# Patient Record
Sex: FEMALE | Race: WHITE | Employment: STUDENT | URBAN - METROPOLITAN AREA
[De-identification: names, ages, dates, MRNs, and addresses within clinical notes are randomized per-mention and may not be internally consistent; named-entity substitution may affect disease eponyms.]

---
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
## 2023-02-01 RX ORDER — PANTOPRAZOLE SODIUM 40 MG/1
TABLET, DELAYED RELEASE ORAL
Qty: 180 TABLET | Refills: 3 | OUTPATIENT
Start: 2023-02-01

## 2023-05-24 ENCOUNTER — TELEPHONE (OUTPATIENT)
Dept: FAMILY MEDICINE CLINIC | Facility: CLINIC | Age: 18
End: 2023-05-24

## 2023-05-24 DIAGNOSIS — K21.9 GASTROESOPHAGEAL REFLUX DISEASE WITHOUT ESOPHAGITIS: Primary | ICD-10-CM

## 2023-05-24 RX ORDER — PANTOPRAZOLE SODIUM 40 MG/1
40 TABLET, DELAYED RELEASE ORAL DAILY
Qty: 90 TABLET | Refills: 0 | Status: SHIPPED | OUTPATIENT
Start: 2023-05-24

## 2023-06-09 DIAGNOSIS — K21.9 GASTROESOPHAGEAL REFLUX DISEASE WITHOUT ESOPHAGITIS: ICD-10-CM

## 2023-06-09 RX ORDER — PANTOPRAZOLE SODIUM 40 MG/1
TABLET, DELAYED RELEASE ORAL
Qty: 180 TABLET | Refills: 3 | Status: SHIPPED | OUTPATIENT
Start: 2023-06-09

## 2023-09-06 SDOH — ECONOMIC STABILITY: FOOD INSECURITY: WITHIN THE PAST 12 MONTHS, THE FOOD YOU BOUGHT JUST DIDN'T LAST AND YOU DIDN'T HAVE MONEY TO GET MORE.: PATIENT DECLINED

## 2023-09-06 SDOH — ECONOMIC STABILITY: HOUSING INSECURITY
IN THE LAST 12 MONTHS, WAS THERE A TIME WHEN YOU DID NOT HAVE A STEADY PLACE TO SLEEP OR SLEPT IN A SHELTER (INCLUDING NOW)?: NO

## 2023-09-06 SDOH — ECONOMIC STABILITY: FOOD INSECURITY: WITHIN THE PAST 12 MONTHS, YOU WORRIED THAT YOUR FOOD WOULD RUN OUT BEFORE YOU GOT MONEY TO BUY MORE.: PATIENT DECLINED

## 2023-09-06 SDOH — ECONOMIC STABILITY: TRANSPORTATION INSECURITY
IN THE PAST 12 MONTHS, HAS LACK OF TRANSPORTATION KEPT YOU FROM MEETINGS, WORK, OR FROM GETTING THINGS NEEDED FOR DAILY LIVING?: NO

## 2023-09-06 SDOH — ECONOMIC STABILITY: INCOME INSECURITY: HOW HARD IS IT FOR YOU TO PAY FOR THE VERY BASICS LIKE FOOD, HOUSING, MEDICAL CARE, AND HEATING?: PATIENT DECLINED

## 2023-09-07 ENCOUNTER — OFFICE VISIT (OUTPATIENT)
Dept: FAMILY MEDICINE CLINIC | Facility: CLINIC | Age: 18
End: 2023-09-07
Payer: COMMERCIAL

## 2023-09-07 ENCOUNTER — TELEPHONE (OUTPATIENT)
Dept: FAMILY MEDICINE CLINIC | Facility: CLINIC | Age: 18
End: 2023-09-07

## 2023-09-07 VITALS
DIASTOLIC BLOOD PRESSURE: 70 MMHG | BODY MASS INDEX: 31.15 KG/M2 | RESPIRATION RATE: 16 BRPM | OXYGEN SATURATION: 98 % | HEIGHT: 61 IN | WEIGHT: 165 LBS | TEMPERATURE: 97.1 F | SYSTOLIC BLOOD PRESSURE: 108 MMHG | HEART RATE: 80 BPM

## 2023-09-07 DIAGNOSIS — K21.9 GASTROESOPHAGEAL REFLUX DISEASE WITHOUT ESOPHAGITIS: ICD-10-CM

## 2023-09-07 DIAGNOSIS — L40.9 PSORIASIS (A TYPE OF SKIN INFLAMMATION): Primary | ICD-10-CM

## 2023-09-07 DIAGNOSIS — Z23 ENCOUNTER FOR ADMINISTRATION OF VACCINE: ICD-10-CM

## 2023-09-07 DIAGNOSIS — D50.8 IRON DEFICIENCY ANEMIA SECONDARY TO INADEQUATE DIETARY IRON INTAKE: Primary | ICD-10-CM

## 2023-09-07 LAB
BASOPHILS # BLD: 0.1 K/UL (ref 0–0.2)
BASOPHILS NFR BLD: 1 % (ref 0–2)
DIFFERENTIAL METHOD BLD: ABNORMAL
EOSINOPHIL # BLD: 0.3 K/UL (ref 0–0.8)
EOSINOPHIL NFR BLD: 3 % (ref 0.5–7.8)
ERYTHROCYTE [DISTWIDTH] IN BLOOD BY AUTOMATED COUNT: 17.1 % (ref 11.9–14.6)
HCT VFR BLD AUTO: 37.1 % (ref 35.8–46.3)
HGB BLD-MCNC: 11.6 G/DL (ref 11.7–15.4)
IMM GRANULOCYTES # BLD AUTO: 0 K/UL (ref 0–0.5)
IMM GRANULOCYTES NFR BLD AUTO: 0 % (ref 0–5)
LYMPHOCYTES # BLD: 3.3 K/UL (ref 0.5–4.6)
LYMPHOCYTES NFR BLD: 36 % (ref 13–44)
MCH RBC QN AUTO: 24.9 PG (ref 26.1–32.9)
MCHC RBC AUTO-ENTMCNC: 31.3 G/DL (ref 31.4–35)
MCV RBC AUTO: 79.6 FL (ref 82–102)
MONOCYTES # BLD: 0.9 K/UL (ref 0.1–1.3)
MONOCYTES NFR BLD: 10 % (ref 4–12)
NEUTS SEG # BLD: 4.5 K/UL (ref 1.7–8.2)
NEUTS SEG NFR BLD: 50 % (ref 43–78)
NRBC # BLD: 0 K/UL (ref 0–0.2)
PLATELET # BLD AUTO: 375 K/UL (ref 150–450)
PMV BLD AUTO: 9.6 FL (ref 9.4–12.3)
RBC # BLD AUTO: 4.66 M/UL (ref 4.05–5.2)
WBC # BLD AUTO: 9 K/UL (ref 4.3–11.1)

## 2023-09-07 PROCEDURE — 90674 CCIIV4 VAC NO PRSV 0.5 ML IM: CPT | Performed by: NURSE PRACTITIONER

## 2023-09-07 PROCEDURE — 90471 IMMUNIZATION ADMIN: CPT | Performed by: NURSE PRACTITIONER

## 2023-09-07 PROCEDURE — 99214 OFFICE O/P EST MOD 30 MIN: CPT | Performed by: NURSE PRACTITIONER

## 2023-09-07 RX ORDER — PANTOPRAZOLE SODIUM 40 MG/1
TABLET, DELAYED RELEASE ORAL
Qty: 180 TABLET | Refills: 3 | Status: SHIPPED | OUTPATIENT
Start: 2023-09-07

## 2023-09-07 ASSESSMENT — ENCOUNTER SYMPTOMS
VOMITING: 0
CHEST TIGHTNESS: 0
WHEEZING: 0
ABDOMINAL PAIN: 0
CONSTIPATION: 0
SHORTNESS OF BREATH: 0
EYE PAIN: 0
COUGH: 0
RHINORRHEA: 0
DIARRHEA: 0
EYE DISCHARGE: 0
BLOOD IN STOOL: 0

## 2023-09-07 ASSESSMENT — PATIENT HEALTH QUESTIONNAIRE - PHQ9
2. FEELING DOWN, DEPRESSED OR HOPELESS: 0
SUM OF ALL RESPONSES TO PHQ QUESTIONS 1-9: 0
SUM OF ALL RESPONSES TO PHQ QUESTIONS 1-9: 0
SUM OF ALL RESPONSES TO PHQ9 QUESTIONS 1 & 2: 0
SUM OF ALL RESPONSES TO PHQ QUESTIONS 1-9: 0
1. LITTLE INTEREST OR PLEASURE IN DOING THINGS: 0
SUM OF ALL RESPONSES TO PHQ QUESTIONS 1-9: 0

## 2023-09-07 NOTE — PROGRESS NOTES
Jessica Parson (:  2005) is a 19 y.o. female,Established patient, here for evaluation of the following chief complaint(s):  Joint Pain (Follow up ) and Gastroesophageal Reflux (Follow up )      Results for orders placed or performed in visit on 23   Iron   Result Value Ref Range    Iron 70 35 - 150 ug/dL   Ferritin   Result Value Ref Range    Ferritin 4 (L) 8 - 388 NG/ML   CBC with Auto Differential   Result Value Ref Range    WBC 9.0 4.3 - 11.1 K/uL    RBC 4.66 4.05 - 5.2 M/uL    Hemoglobin 11.6 (L) 11.7 - 15.4 g/dL    Hematocrit 37.1 35.8 - 46.3 %    MCV 79.6 (L) 82 - 102 FL    MCH 24.9 (L) 26.1 - 32.9 PG    MCHC 31.3 (L) 31.4 - 35.0 g/dL    RDW 17.1 (H) 11.9 - 14.6 %    Platelets 024 833 - 149 K/uL    MPV 9.6 9.4 - 12.3 FL    nRBC 0.00 0.0 - 0.2 K/uL    Differential Type AUTOMATED      Neutrophils % 50 43 - 78 %    Lymphocytes % 36 13 - 44 %    Monocytes % 10 4.0 - 12.0 %    Eosinophils % 3 0.5 - 7.8 %    Basophils % 1 0.0 - 2.0 %    Immature Granulocytes 0 0.0 - 5.0 %    Neutrophils Absolute 4.5 1.7 - 8.2 K/UL    Lymphocytes Absolute 3.3 0.5 - 4.6 K/UL    Monocytes Absolute 0.9 0.1 - 1.3 K/UL    Eosinophils Absolute 0.3 0.0 - 0.8 K/UL    Basophils Absolute 0.1 0.0 - 0.2 K/UL    Absolute Immature Granulocyte 0.0 0.0 - 0.5 K/UL     Iron levels have improved. See lab result note. ASSESSMENT/PLAN:  1. Iron deficiency anemia secondary to inadequate dietary iron intake  -     CBC with Auto Differential; Future  -     Ferritin; Future  -     Iron; Future  Recheck labs. Continue MV with iron. 2. Gastroesophageal reflux disease without esophagitis  -     pantoprazole (PROTONIX) 40 MG tablet; TAKE 1 TABLET BY MOUTH TWICE DAILY, Disp-180 tablet, R-3Normal  Continue PPI. Follow up in 1 year. 3. 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         Return in about 6 months (around

## 2023-09-07 NOTE — TELEPHONE ENCOUNTER
Pt. Would like new referral in for Rheumatology, looks like previous one Hope noted on stating pt.  Unable to be scheduled with the notes on referral

## 2023-09-07 NOTE — TELEPHONE ENCOUNTER
Rewrote her referral to rheumatologist.  Patient was denied as her DAVE and RA was negative.   Iraj De Oliveira

## 2023-09-08 LAB
FERRITIN SERPL-MCNC: 4 NG/ML (ref 8–388)
IRON SERPL-MCNC: 70 UG/DL (ref 35–150)

## 2023-09-09 ASSESSMENT — ENCOUNTER SYMPTOMS: HEARTBURN: 1

## 2023-11-30 NOTE — PROGRESS NOTES
mcg/actuation aerosol inhaler      Ruxolitinib Phosphate (OPZELURA) 1.5 % CREA Opzelura 1.5 % topical cream       No current facility-administered medications for this visit. Physical Exam:  Blood pressure (!) 128/55, pulse 68, height 1.549 m (5' 0.98\"), weight 75.8 kg (167 lb). General:  Patient alert, cooperative and in no apparent distress. HEENT: Pupils equally reactive to light and accomodation, no scleral injections. Neck supple, no lymphadenopathy, no thyromegaly. No alopecia. Skin:  erythema over cheeks, no lesions in mouth, plaques over right antecubital fossa and right extensor wrist. No nail abnormalities. Cardiac:  Normal rate and rhythm. No murmurs, rubs and gallops appreciated. Lungs: Clear to auscultation bilaterally. Abdomen: Soft, non tender with no hepatosplenomegaly. Neurologic:  Oriented, normal speech and affect. Normal gait. Extremities:  No edema in bilateral lower extremities, no cyanosis or clubbing. Muskoskeletal Exam:     I examined the neck, spine, shoulders, elbows, wrists, MCPs, PIPs, DIPs, knees, hips, ankles and feet bilaterally for strength, range of motion, deformity, tenderness, swelling, and synovitis. The findings are:  No joint tenderness or synovitis of the 2nd to 5th MCP, PIP or DIP joints. No synovitis or tenderness of the wrists and no warmth or redness. Intact ROM of the wrist to flexion and extension. No tenderness of the elbows with no swelling, warmth or redness with intact ROM to flexion and extension. No tenderness of the shoulders anteriorly with intact ROM to abduction and internal rotation. No tenderness of the C spine and no tenderness of the para spinal muscles of the neck. No tenderness of the T and L spine. No SI joint tenderness. No mid joint line tenderness of the knees with no swelling, warmth or redness. Right patella is medially tracking. No tenderness with synovitis in the ankles with no warmth or redness.  No

## 2023-12-01 ENCOUNTER — OFFICE VISIT (OUTPATIENT)
Dept: RHEUMATOLOGY | Age: 18
End: 2023-12-01
Payer: COMMERCIAL

## 2023-12-01 ENCOUNTER — HOSPITAL ENCOUNTER (OUTPATIENT)
Dept: GENERAL RADIOLOGY | Age: 18
Discharge: HOME | End: 2023-12-01
Payer: COMMERCIAL

## 2023-12-01 VITALS
WEIGHT: 167 LBS | BODY MASS INDEX: 31.53 KG/M2 | DIASTOLIC BLOOD PRESSURE: 55 MMHG | HEIGHT: 61 IN | HEART RATE: 68 BPM | SYSTOLIC BLOOD PRESSURE: 128 MMHG

## 2023-12-01 DIAGNOSIS — M25.50 ARTHRALGIA, UNSPECIFIED JOINT: ICD-10-CM

## 2023-12-01 DIAGNOSIS — M35.7 BENIGN JOINT HYPERMOBILITY: ICD-10-CM

## 2023-12-01 DIAGNOSIS — D50.9 IRON DEFICIENCY ANEMIA, UNSPECIFIED IRON DEFICIENCY ANEMIA TYPE: ICD-10-CM

## 2023-12-01 DIAGNOSIS — M25.562 CHRONIC PAIN OF BOTH KNEES: ICD-10-CM

## 2023-12-01 DIAGNOSIS — M35.7 BENIGN JOINT HYPERMOBILITY: Primary | ICD-10-CM

## 2023-12-01 DIAGNOSIS — G89.29 CHRONIC PAIN OF BOTH KNEES: ICD-10-CM

## 2023-12-01 DIAGNOSIS — M25.561 CHRONIC PAIN OF BOTH KNEES: ICD-10-CM

## 2023-12-01 DIAGNOSIS — L30.9 ECZEMA, UNSPECIFIED TYPE: ICD-10-CM

## 2023-12-01 LAB
ALBUMIN SERPL-MCNC: 4 G/DL (ref 3.5–5)
ALBUMIN/GLOB SERPL: 1.3 (ref 0.4–1.6)
ALP SERPL-CCNC: 38 U/L (ref 50–136)
ALT SERPL-CCNC: 19 U/L (ref 12–65)
ANION GAP SERPL CALC-SCNC: 7 MMOL/L (ref 2–11)
APPEARANCE UR: CLEAR
AST SERPL-CCNC: 16 U/L (ref 15–37)
BACTERIA URNS QL MICRO: ABNORMAL /HPF
BASOPHILS # BLD: 0.1 K/UL (ref 0–0.2)
BASOPHILS NFR BLD: 1 % (ref 0–2)
BILIRUB SERPL-MCNC: 1.2 MG/DL (ref 0.2–1.1)
BILIRUB UR QL: NEGATIVE
BUN SERPL-MCNC: 11 MG/DL (ref 6–23)
CALCIUM SERPL-MCNC: 9.1 MG/DL (ref 8.3–10.4)
CHLORIDE SERPL-SCNC: 108 MMOL/L (ref 101–110)
CO2 SERPL-SCNC: 24 MMOL/L (ref 21–32)
COLOR UR: ABNORMAL
CREAT SERPL-MCNC: 0.9 MG/DL (ref 0.6–1)
CREAT UR-MCNC: 184 MG/DL
CRP SERPL-MCNC: 0.5 MG/DL (ref 0–0.9)
DIFFERENTIAL METHOD BLD: NORMAL
EOSINOPHIL # BLD: 0.2 K/UL (ref 0–0.8)
EOSINOPHIL NFR BLD: 3 % (ref 0.5–7.8)
EPI CELLS #/AREA URNS HPF: ABNORMAL /HPF
ERYTHROCYTE [DISTWIDTH] IN BLOOD BY AUTOMATED COUNT: 13.3 % (ref 11.9–14.6)
ERYTHROCYTE [SEDIMENTATION RATE] IN BLOOD: 2 MM/HR (ref 0–20)
GLOBULIN SER CALC-MCNC: 3 G/DL (ref 2.8–4.5)
GLUCOSE SERPL-MCNC: 87 MG/DL (ref 65–100)
GLUCOSE UR STRIP.AUTO-MCNC: NEGATIVE MG/DL
HCT VFR BLD AUTO: 36.5 % (ref 35.8–46.3)
HGB BLD-MCNC: 11.8 G/DL (ref 11.7–15.4)
HGB UR QL STRIP: ABNORMAL
IMM GRANULOCYTES # BLD AUTO: 0 K/UL (ref 0–0.5)
IMM GRANULOCYTES NFR BLD AUTO: 0 % (ref 0–5)
KETONES UR QL STRIP.AUTO: NEGATIVE MG/DL
LEUKOCYTE ESTERASE UR QL STRIP.AUTO: ABNORMAL
LYMPHOCYTES # BLD: 2.1 K/UL (ref 0.5–4.6)
LYMPHOCYTES NFR BLD: 30 % (ref 13–44)
MCH RBC QN AUTO: 27.3 PG (ref 26.1–32.9)
MCHC RBC AUTO-ENTMCNC: 32.3 G/DL (ref 31.4–35)
MCV RBC AUTO: 84.3 FL (ref 82–102)
MONOCYTES # BLD: 0.6 K/UL (ref 0.1–1.3)
MONOCYTES NFR BLD: 9 % (ref 4–12)
NEUTS SEG # BLD: 4 K/UL (ref 1.7–8.2)
NEUTS SEG NFR BLD: 57 % (ref 43–78)
NITRITE UR QL STRIP.AUTO: NEGATIVE
NRBC # BLD: 0 K/UL (ref 0–0.2)
OTHER OBSERVATIONS: ABNORMAL
PH UR STRIP: 6.5 (ref 5–9)
PLATELET # BLD AUTO: 322 K/UL (ref 150–450)
PMV BLD AUTO: 9.5 FL (ref 9.4–12.3)
POTASSIUM SERPL-SCNC: 4.2 MMOL/L (ref 3.5–5.1)
PROT SERPL-MCNC: 7 G/DL (ref 6.3–8.2)
PROT UR STRIP-MCNC: NEGATIVE MG/DL
PROT UR-MCNC: 15 MG/DL
PROT/CREAT UR-RTO: 0.1
RBC # BLD AUTO: 4.33 M/UL (ref 4.05–5.2)
RBC #/AREA URNS HPF: ABNORMAL /HPF
SODIUM SERPL-SCNC: 139 MMOL/L (ref 133–143)
SP GR UR REFRACTOMETRY: 1.02 (ref 1–1.02)
UROBILINOGEN UR QL STRIP.AUTO: 1 EU/DL (ref 0.2–1)
WBC # BLD AUTO: 7.1 K/UL (ref 4.3–11.1)
WBC URNS QL MICRO: ABNORMAL /HPF

## 2023-12-01 PROCEDURE — 73110 X-RAY EXAM OF WRIST: CPT

## 2023-12-01 PROCEDURE — 73564 X-RAY EXAM KNEE 4 OR MORE: CPT

## 2023-12-01 PROCEDURE — 99244 OFF/OP CNSLTJ NEW/EST MOD 40: CPT | Performed by: INTERNAL MEDICINE

## 2023-12-01 PROCEDURE — 73130 X-RAY EXAM OF HAND: CPT

## 2023-12-01 RX ORDER — ALBUTEROL SULFATE 90 UG/1
AEROSOL, METERED RESPIRATORY (INHALATION)
COMMUNITY

## 2023-12-01 RX ORDER — RUXOLITINIB 15 MG/G
CREAM TOPICAL
COMMUNITY

## 2023-12-01 ASSESSMENT — JOINT PAIN
TOTAL NUMBER OF SWOLLEN JOINTS: 0
TOTAL NUMBER OF TENDER JOINTS: 0

## 2023-12-01 ASSESSMENT — ROUTINE ASSESSMENT OF PATIENT INDEX DATA (RAPID3)
WHEN YOU AWAKENED IN THE MORNING OVER THE LAST WEEK, PLEASE INDICATE THE AMOUNT OF TIME IT TAKES UNTIL YOU ARE AS LIMBER AS YOU WILL BE FOR THE DAY: 30 MIN
ON A SCALE OF ONE TO TEN, HOW MUCH PAIN HAVE YOU HAD BECAUSE OF YOUR CONDITION OVER THE PAST WEEK?: 6
ON A SCALE OF ONE TO TEN, HOW DIFFICULT WAS IT FOR YOU TO COMPLETE THE LISTED DAILY PHYSICAL TASKS OVER THE LAST WEEK: 0.7
ON A SCALE OF ONE TO TEN, HOW MUCH OF A PROBLEM HAS UNUSUAL FATIGUE OR TIREDNESS BEEN FOR YOU OVER THE PAST WEEK?: 5
ON A SCALE OF ONE TO TEN, CONSIDERING ALL THE WAYS IN WHICH ILLNESS AND HEALTH CONDITIONS MAY AFFECT YOU AT THIS TIME, PLEASE INDICATE BELOW HOW YOU ARE DOING:: 5

## 2023-12-01 NOTE — PATIENT INSTRUCTIONS
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 Continue to use as needed ibuprofen. Start physical therapy for knees. Return for f/u in 2 weeks.

## 2023-12-01 NOTE — ASSESSMENT & PLAN NOTE
-stable, mildly active  -continue dupixent per dermatology  -will obtain records to review skin biopsy  -unclear etiology of rash in mouth, ?mucositis but no pictures to review and asked the patient to inform her dermatologist of this

## 2023-12-01 NOTE — ASSESSMENT & PLAN NOTE
-check x-rays of hands, wrists, and knees  -start PT for the bilateral knee pain  -continue as needed ibuprofen  -check ESR, CRP, RF, CCP, CBC, CMP, UA, UPCR

## 2023-12-02 LAB — CCP IGA+IGG SERPL IA-ACNC: 3 UNITS (ref 0–19)

## 2023-12-15 ENCOUNTER — TELEMEDICINE (OUTPATIENT)
Dept: RHEUMATOLOGY | Age: 18
End: 2023-12-15
Payer: COMMERCIAL

## 2023-12-15 DIAGNOSIS — L30.9 ECZEMA, UNSPECIFIED TYPE: ICD-10-CM

## 2023-12-15 DIAGNOSIS — M25.561 CHRONIC PAIN OF BOTH KNEES: ICD-10-CM

## 2023-12-15 DIAGNOSIS — R17 ELEVATED BILIRUBIN: ICD-10-CM

## 2023-12-15 DIAGNOSIS — M35.7 BENIGN JOINT HYPERMOBILITY: ICD-10-CM

## 2023-12-15 DIAGNOSIS — D50.9 IRON DEFICIENCY ANEMIA, UNSPECIFIED IRON DEFICIENCY ANEMIA TYPE: ICD-10-CM

## 2023-12-15 DIAGNOSIS — M25.562 CHRONIC PAIN OF BOTH KNEES: ICD-10-CM

## 2023-12-15 DIAGNOSIS — G89.29 CHRONIC PAIN OF BOTH KNEES: ICD-10-CM

## 2023-12-15 DIAGNOSIS — R31.21 ASYMPTOMATIC MICROSCOPIC HEMATURIA: ICD-10-CM

## 2023-12-15 DIAGNOSIS — M25.50 ARTHRALGIA, UNSPECIFIED JOINT: Primary | ICD-10-CM

## 2023-12-15 DIAGNOSIS — R74.8 LOW SERUM ALKALINE PHOSPHATASE: ICD-10-CM

## 2023-12-15 PROCEDURE — 99213 OFFICE O/P EST LOW 20 MIN: CPT | Performed by: INTERNAL MEDICINE

## 2023-12-15 NOTE — ASSESSMENT & PLAN NOTE
Possibly from microscopic hematuria vs gilbert syndrome (asymptomatic elevated bilirubin which can lead to hemolysis). No heavy periods.  No GI symptoms to support screening for celiac disease.     -resolved w/iron supplmentation   -would need hemolytic anemia labs during next episode of anemia to tell if this could be from gilbert's disease

## 2023-12-15 NOTE — ASSESSMENT & PLAN NOTE
UA w/moderate blood, 10-20 RBC/hpf, also had epithelial cells and bacteria.  Not on period during test. Ddx includes ?kidney stones less likely GN, may do renal US if persists     -repeat UA

## 2023-12-15 NOTE — PROGRESS NOTES
or significant degenerative changes of the  bilateral wrists, bilateral hands, or bilateral knees evident by plain film. No  bony erosions are seen of the hands or wrists. Last visit seen in consult for the above issues. Advised to continue as needed ibuprofen and to start physical therapy for the knees. Today:  She got covid, and lost her voice, she is getting better slowly. She denies bone pain, muscle pain, recurrent fractures. She did have history of a fractured right ankle in the past without realizing it and with no trauma. She lost her baby teeth way before her twin brother. Her teeth are in okay health right now. She was vomiting b/c of acid reflux and this somewhat affected teeth but otherwise they are in good health. She was not aware of her bilirubin level was elevated. No one in the family has this to her knowledge.     Physical therapy- they called and she will be starting PT for the knees 1/2024  Ibuprofen- using as needed for pain        Objective   Patient-Reported Vitals  No data recorded     Physical Exam  [INSTRUCTIONS:  \"[x]\" Indicates a positive item  \"[]\" Indicates a negative item  -- DELETE ALL ITEMS NOT EXAMINED]    Constitutional: [x] Appears well-developed and well-nourished [x] No apparent distress      [] Abnormal -     Mental status: [x] Alert and awake  [x] Oriented to person/place/time [x] Able to follow commands    [] Abnormal -     Eyes:   EOM    [x]  Normal    [] Abnormal -   Sclera  [x]  Normal    [] Abnormal -          Discharge [x]  None visible   [] Abnormal -     HENT: [x] Normocephalic, atraumatic  [] Abnormal -     External Ears [x] Normal  [] Abnormal -    Neck: [x] No visualized mass [] Abnormal -     Pulmonary/Chest: [x] Respiratory effort normal   [x] No visualized signs of difficulty breathing or respiratory distress        [] Abnormal -     Neurological:        [x] No Facial Asymmetry (Cranial nerve 7 motor function) (limited exam due to video visit)

## 2023-12-15 NOTE — PATIENT INSTRUCTIONS
Joint pain likely from hypermobility.   -start PT for knees  -continue as needed ibuprofen  -please get labs and repeat urine tests once covid infection is resolved and make sure you're not on your period  -F/u in 3 months

## 2023-12-15 NOTE — ASSESSMENT & PLAN NOTE
Hypermobile in the left 5th MCP, b/l elbows and b/l knees. Has benign joint hypermobility, which can lead to early joint pain in young adults. Also unclear if pain is worse w/dupixent which can cause joint pain. D/w patient and the benefits of treating eczema outweigh risks of stopping medication but could also consider other biologics for treatment of eczema if joint pain is refractory. Other etiologies include hypophosphatasia (low alk phos, fracture, early loss of baby teeth, joint pain). Positive/abnormal: UA with moderate blood (not on period), low alk phos 38, elevated total bilirubin 1.2  negative/normal: DAVE, CCP, RF, rest of CMP, CBC, CRP, ESR, UPCR    X-rays of the bilateral hands, knees, wrists  IMPRESSION:  1. No acute osseous abnormality or significant degenerative changes of the bilateral wrists, bilateral hands, or bilateral knees evident by plain film. No bony erosions are seen of the hands or wrists. 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 Left should with popping with far ROM could indicate labrum problem but not very symptomatic.     Suspect right ankle pain/swelling 2/2 to prior fracture.    -continue PT for bilateral knee pain  -continue ibuprofen prn  -check labs to screen for hypophosphatasia   -RTC in 3 months for f/u to assess treatment response

## 2024-01-02 ENCOUNTER — HOSPITAL ENCOUNTER (OUTPATIENT)
Dept: PHYSICAL THERAPY | Age: 19
Setting detail: RECURRING SERIES
Discharge: HOME | End: 2024-01-05
Attending: INTERNAL MEDICINE
Payer: COMMERCIAL

## 2024-01-02 DIAGNOSIS — G89.29 CHRONIC PAIN OF LEFT KNEE: Primary | ICD-10-CM

## 2024-01-02 DIAGNOSIS — M25.561 CHRONIC PAIN OF RIGHT KNEE: ICD-10-CM

## 2024-01-02 DIAGNOSIS — M25.562 CHRONIC PAIN OF LEFT KNEE: Primary | ICD-10-CM

## 2024-01-02 DIAGNOSIS — M25.552 PAIN IN LEFT HIP: ICD-10-CM

## 2024-01-02 DIAGNOSIS — M35.7 HYPERMOBILITY SYNDROME: ICD-10-CM

## 2024-01-02 DIAGNOSIS — G89.29 CHRONIC PAIN OF RIGHT KNEE: ICD-10-CM

## 2024-01-02 PROCEDURE — 97162 PT EVAL MOD COMPLEX 30 MIN: CPT

## 2024-01-02 ASSESSMENT — PAIN SCALES - GENERAL: PAINLEVEL_OUTOF10: 6

## 2024-01-02 NOTE — PROGRESS NOTES
April Nellie  : 2005  Primary: Jac Chung (Javier HUDSON)  Secondary:  [facility] @ [place] [address] [place] [address]  Phone: [phone]  Fax: [phone] Plan Frequency: 2x for 8 weeks (pt work and school schedule may conflict)  Plan of Care/Certification Expiration Date: 24      >PT Visit Info:  Plan Frequency: 2x for 8 weeks (pt work and school schedule may conflict)  Plan of Care/Certification Expiration Date: 24      Visit Count:  1    OUTPATIENT PHYSICAL THERAPY:OP NOTE TYPE: Treatment Note 2024       Episode  }Appt Desk             Treatment Diagnosis:    Chronic pain of left knee  Chronic pain of right knee  Hypermobility syndrome  Pain in left hip  Medical/Referring Diagnosis:  Benign joint hypermobility [M35.7]  Chronic pain of both knees [M25.561, M25.562, G89.29]  Referring Physician:  Topher Sandra DO MD Orders:  PT Eval and Treat    Date of Onset:  Onset Date: 24 (Chronic pain for many years, no DEVIKA)     Allergies:   Minocycline and Adhesive tape  Restrictions/Precautions:  No data recordedNo data recorded   Interventions Planned (Treatment may consist of any combination of the following):    No data recorded   >Subjective Comments:   Chief complaint of L patellar pain chronic onset with hypermobility syndrome   >Initial:     6/10>Post Session:       5/10  Medications Last Reviewed:  2024  Updated Objective Findings:  See Evaluation Note from today  Treatment   THERAPEUTIC EXERCISE: (7 minutes):    Exercises per grid below to improve mobility, strength, and balance.  Required minimal visual and manual cues to promote proper body alignment, promote proper body posture, and promote proper body mechanics.  Progressed resistance, range, and repetitions as indicated.  Date: 24    POC and HEP rev  Clam shells x10\" to fatigue B   SLR on L x10\"   Standing education   Quadruped hip rocks     Deferred

## 2024-01-02 NOTE — THERAPY EVALUATION
April Ballard  : 2005  Primary: Jac Chung (Javier HUDSON)  Secondary:  [facility] @ [place] [address] [address] [place] [address]  Phone: [phone]  Fax: [phone] Plan Frequency: 2x for 8 weeks (pt work and school schedule may conflict)    Plan of Care/Certification Expiration Date: 24        Plan of Care/Certification Expiration Date:  Plan of Care/Certification Expiration Date: 24    Frequency/Duration: Plan Frequency: 2x for 8 weeks (pt work and school schedule may conflict)      Time In/Out:          PT Visit Info:    Plan Frequency: 2x for 8 weeks (pt work and school schedule may conflict)      Visit Count:  1                OUTPATIENT PHYSICAL THERAPY:             Initial Assessment 2024               Episode (B Knee Pain)         Treatment Diagnosis:      Chronic pain of left knee  Chronic pain of right knee  Hypermobility syndrome  Pain in left hip  Medical/Referring Diagnosis:    Benign joint hypermobility  Chronic pain of both knees      Referring Physician:  Topehr Sandra DO MD Orders:  PT Eval and Treat   Return MD Appt:     Date of Onset:  Onset Date: 24 (Chronic pain for many years, no DEVIKA)    Allergies:  Minocycline and Adhesive tape  Restrictions/Precautions:      *Hypermobility syndrome*   Medications Last Reviewed:  2024     SUBJECTIVE   History of Injury/Illness (Reason for Referral):  April, \"Floridalma\", presents with a chief complaint of joint pain, especially of the L knee located under the patella and \"deep in the knee\". Pt reports no DEVIKA but overall chronic and progressive knee and L hip pain over the years. Pt reports she has been diagnosed with benign joint hypermobility by rheumatology recently. Aggravating factors are any prolonged standing or positioning, she can get relief by popping her joints. She reports loud and relieving popping throughout her entire body. She has hx of R ankle fracture in high school,

## 2024-01-08 ENCOUNTER — HOSPITAL ENCOUNTER (OUTPATIENT)
Dept: PHYSICAL THERAPY | Age: 19
Setting detail: RECURRING SERIES
Discharge: HOME | End: 2024-01-11
Attending: INTERNAL MEDICINE
Payer: COMMERCIAL

## 2024-01-08 PROCEDURE — 97110 THERAPEUTIC EXERCISES: CPT

## 2024-01-08 PROCEDURE — 97140 MANUAL THERAPY 1/> REGIONS: CPT

## 2024-01-08 ASSESSMENT — PAIN SCALES - GENERAL: PAINLEVEL_OUTOF10: 0

## 2024-01-08 NOTE — PROGRESS NOTES
April Ballard  : 2005  Primary: Jac Chung (Javier HUDSON)  Secondary:  [facility] @ [place] [address] [address] [place] [address]  Phone: [phone]  Fax: [phone] Plan Frequency: 2x for 8 weeks (pt work and school schedule may conflict)  Plan of Care/Certification Expiration Date: 24      >PT Visit Info:  Plan Frequency: 2x for 8 weeks (pt work and school schedule may conflict)  Plan of Care/Certification Expiration Date: 24      Visit Count:  2    OUTPATIENT PHYSICAL THERAPY:OP NOTE TYPE: Treatment Note 2024       Episode  }Appt Desk             Treatment Diagnosis:    Chronic pain of left knee  Chronic pain of right knee  Hypermobility syndrome  Pain in left hip  Medical/Referring Diagnosis:  Benign joint hypermobility [M35.7]  Chronic pain of both knees [M25.561, M25.562, G89.29]  Referring Physician:  Topher Sandra DO MD Orders:  PT Eval and Treat    Date of Onset:  Onset Date: 24 (Chronic pain for many years, no DEVIKA)     Allergies:   Minocycline and Adhesive tape  Restrictions/Precautions:  No data recordedNo data recorded   Interventions Planned (Treatment may consist of any combination of the following):    No data recorded   >Subjective Comments:   Floridalma reports her knees are not hurting at all today, but 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  >Initial:     0/10>Post Session:       0/10  Medications Last Reviewed:  2024  Updated Objective Findings:    Treatment   THERAPEUTIC EXERCISE: (40 minutes):    Exercises per grid below to improve mobility, strength, and balance.  Required minimal visual and manual cues to promote proper body alignment, promote proper body posture, and promote proper body mechanics.  Progressed resistance, range, and repetitions as indicated.  Date: 24     Date:  24   Activity/Exercise Parameters   bike L3 5 min   clamshells

## 2024-01-16 ENCOUNTER — HOSPITAL ENCOUNTER (OUTPATIENT)
Dept: PHYSICAL THERAPY | Age: 19
Setting detail: RECURRING SERIES
Discharge: HOME | End: 2024-01-19
Attending: INTERNAL MEDICINE
Payer: COMMERCIAL

## 2024-01-16 PROCEDURE — 97110 THERAPEUTIC EXERCISES: CPT

## 2024-01-16 NOTE — PROGRESS NOTES
April Ballard  : 2005  Primary: Jac Chung (Javier HUDSNO)  Secondary:  [facility] @ [place] [address] [address] [place] [place] [address]  Phone: [phone]  Fax: [phone] Plan Frequency: 2x for 8 weeks (pt work and school schedule may conflict)  Plan of Care/Certification Expiration Date: 24      >PT Visit Info:  Plan Frequency: 2x for 8 weeks (pt work and school schedule may conflict)  Plan of Care/Certification Expiration Date: 24      Visit Count:  3    OUTPATIENT PHYSICAL THERAPY:OP NOTE TYPE: Treatment Note 2024       Episode  }Appt Desk             Treatment Diagnosis:    Chronic pain of left knee  Chronic pain of right knee  Hypermobility syndrome  Pain in left hip  Medical/Referring Diagnosis:  Benign joint hypermobility [M35.7]  Chronic pain of both knees [M25.561, M25.562, G89.29]  Referring Physician:  Topher Sandra DO MD Orders:  PT Eval and Treat    Date of Onset:  Onset Date: 24 (Chronic pain for many years, no DEVIKA)     Allergies:   Minocycline and Adhesive tape  Restrictions/Precautions:  No data recordedNo data recorded   Interventions Planned (Treatment may consist of any combination of the following):    No data recorded   >Subjective Comments:   Floridalma stated she is doing alright, she had class today so no pain. She is doing HEP and feels much more aware of her knee hyperextension positions.     >Initial:      /10>Post Session:        /10 no pain   Medications Last Reviewed:  2024  Updated Objective Findings:    L hip PROM WNL And non painful   Treatment   THERAPEUTIC EXERCISE: (55 minutes):    Exercises per grid below to improve mobility, strength, and balance.  Required minimal visual and manual cues to promote proper body alignment, promote proper body posture, and promote proper body mechanics.  Progressed resistance, range, and repetitions as indicated.  Date: 24     Date:  24   Activity/Exercise Parameters   bike --

## 2024-01-18 ENCOUNTER — HOSPITAL ENCOUNTER (OUTPATIENT)
Dept: PHYSICAL THERAPY | Age: 19
Setting detail: RECURRING SERIES
Discharge: HOME | End: 2024-01-21
Attending: INTERNAL MEDICINE
Payer: COMMERCIAL

## 2024-01-18 PROCEDURE — 97110 THERAPEUTIC EXERCISES: CPT

## 2024-01-18 ASSESSMENT — PAIN SCALES - GENERAL: PAINLEVEL_OUTOF10: 2

## 2024-01-18 NOTE — PROGRESS NOTES
added lift off to fatigue    LAQ 10x10 sec ea   Quadruped hip rocks X15 reps    STS DL And SL with TKE and medial rotation control banded  X15 B each round    Bridge  X5 reps    Bridge with single leg knee ext X10\" x6 reps B - fatigued    Prone hip extension  --   PPT X30 3 sec ea   Sidelying hip abduction      Airex semi tandem balance with ball toss  --   Bird dog quad 5\" to fatigue B, UE and LE   -       MANUAL THERAPY: (0 minutes):   Joint mobilization and Soft tissue mobilization was utilized and necessary because of the patient's restricted joint motion, painful spasm, loss of articular motion, and restricted motion of soft tissue.     Deferred   -prone STM to L calf and hamstring, and glutes  -sidelying STM to lateral hips       Treatment/Session Summary:    >Treatment Assessment:  Floridalma did well, medial rotation of the L femur noted. Plan to progress functional strength and control to minimize chronic knee pain.   Communication/Consultation:    Equipment provided today:    Recommendations/Intent for next treatment session: Next visit will focus on core, hip, and LE stability and strength training to minimize chronic pain.    >Total Treatment Billable Duration:  54 minutes   Time In: 1630  Time Out: 1727    SHANDRA APONTE, PT       Charge Capture  }Post Session Pain  PT Visit Info  [employer] Portal  MD Guidelines  Scanned Media  Benefits  MyChart    Future Appointments   Date Time Provider Department Center   1/30/2024  4:30 PM Shandra Aponte, PT [facility]   2/1/2024  4:30 PM Shandra Aponte PT [facility]   2/6/2024  4:30 PM Shandra Aponte PT [facility]   2/8/2024  4:30 PM Shandra Aponte, PT [facility]

## 2024-01-23 ENCOUNTER — HOSPITAL ENCOUNTER (OUTPATIENT)
Dept: PHYSICAL THERAPY | Age: 19
Setting detail: RECURRING SERIES
Discharge: HOME | End: 2024-01-26
Attending: INTERNAL MEDICINE
Payer: COMMERCIAL

## 2024-01-23 PROCEDURE — 97110 THERAPEUTIC EXERCISES: CPT

## 2024-01-23 ASSESSMENT — PAIN SCALES - GENERAL: PAINLEVEL_OUTOF10: 4

## 2024-01-23 NOTE — PROGRESS NOTES
April Ballard  : 2005  Primary: Jac Chung (Javier HUDSON)  Secondary:  [facility] @ [place] [address] [address] [place] [address]  Phone: [phone]  Fax: [phone] Plan Frequency: 2x for 8 weeks (pt work and school schedule may conflict)  Plan of Care/Certification Expiration Date: 24      >PT Visit Info:  Plan Frequency: 2x for 8 weeks (pt work and school schedule may conflict)  Plan of Care/Certification Expiration Date: 24      Visit Count:  5    OUTPATIENT PHYSICAL THERAPY:OP NOTE TYPE: Treatment Note 2024       Episode  }Appt Desk             Treatment Diagnosis:    Chronic pain of left knee  Chronic pain of right knee  Hypermobility syndrome  Pain in left hip  Medical/Referring Diagnosis:  Benign joint hypermobility [M35.7]  Chronic pain of both knees [M25.561, M25.562, G89.29]  Referring Physician:  Topher Sandra DO MD Orders:  PT Eval and Treat    Date of Onset:  Onset Date: 24 (Chronic pain for many years, no DEVIKA)     Allergies:   Minocycline and Adhesive tape  Restrictions/Precautions:  No data recordedNo data recorded   Interventions Planned (Treatment may consist of any combination of the following):    No data recorded   >Subjective Comments:  Floridalma reported she is feeling overall achy today, having some allergic reaction. She had a lot of knee pain today sitting in class. She worked several shifts this weekend and did not have as much pain. She has not been able to keep up with HEP this week.     >Initial:     4/10>Post Session:       1/10    Medications Last Reviewed:  2024  Updated Objective Findings:    L hip PROM WNL And non painful   Treatment   THERAPEUTIC EXERCISE: (55 minutes):    Exercises per grid below to improve mobility, strength, and balance.  Required minimal visual and manual cues to promote proper body alignment, promote proper body posture, and promote proper body mechanics.  Progressed resistance, range, and

## 2024-01-25 ENCOUNTER — HOSPITAL ENCOUNTER (OUTPATIENT)
Dept: PHYSICAL THERAPY | Age: 19
Setting detail: RECURRING SERIES
Discharge: HOME | End: 2024-01-28
Attending: INTERNAL MEDICINE
Payer: COMMERCIAL

## 2024-01-25 PROCEDURE — 97110 THERAPEUTIC EXERCISES: CPT

## 2024-01-25 ASSESSMENT — PAIN SCALES - GENERAL: PAINLEVEL_OUTOF10: 2

## 2024-01-25 NOTE — PROGRESS NOTES
April Ballard  : 2005  Primary: Jac Chung (Javier HUDSON)  Secondary:  [facility] [place] @ [place] [address] [address] [place] [address]  Phone: [phone]  Fax: [phone] Plan Frequency: 2x for 8 weeks (pt work and school schedule may conflict)  Plan of Care/Certification Expiration Date: 24      >PT Visit Info:  Plan Frequency: 2x for 8 weeks (pt work and school schedule may conflict)  Plan of Care/Certification Expiration Date: 24      Visit Count:  6    OUTPATIENT PHYSICAL THERAPY:OP NOTE TYPE: Treatment Note 2024       Episode  }Appt Desk             Treatment Diagnosis:    Chronic pain of left knee  Chronic pain of right knee  Hypermobility syndrome  Pain in left hip  Medical/Referring Diagnosis:  Benign joint hypermobility [M35.7]  Chronic pain of both knees [M25.561, M25.562, G89.29]  Referring Physician:  Topher Sandra DO MD Orders:  PT Eval and Treat    Date of Onset:  Onset Date: 24 (Chronic pain for many years, no DEVIKA)     Allergies:   Minocycline and Adhesive tape  Restrictions/Precautions:  No data recordedNo data recorded   Interventions Planned (Treatment may consist of any combination of the following):    No data recorded   >Subjective Comments:  Floridalma states she is a bit achy today but overall doing well. She was able to do HEP.     >Initial:     210>Post Session:       0/10    Medications Last Reviewed:  2024  Updated Objective Findings:    L hip PROM WNL And non painful   Treatment   THERAPEUTIC EXERCISE: (55 minutes):    Exercises per grid below to improve mobility, strength, and balance.  Required minimal visual and manual cues to promote proper body alignment, promote proper body posture, and promote proper body mechanics.  Progressed resistance, range, and repetitions as indicated.  Date: 24     Date:  24   Activity/Exercise Parameters   bike X4 min    clamshells --   SAQ --   Quadruped hip rocks and cat/camel X15

## 2024-01-30 ENCOUNTER — APPOINTMENT (OUTPATIENT)
Dept: PHYSICAL THERAPY | Age: 19
End: 2024-01-30
Attending: INTERNAL MEDICINE
Payer: COMMERCIAL

## 2024-02-01 ENCOUNTER — HOSPITAL ENCOUNTER (OUTPATIENT)
Dept: PHYSICAL THERAPY | Age: 19
Setting detail: RECURRING SERIES
Discharge: HOME | End: 2024-02-04
Attending: INTERNAL MEDICINE
Payer: COMMERCIAL

## 2024-02-01 PROCEDURE — 97110 THERAPEUTIC EXERCISES: CPT

## 2024-02-01 ASSESSMENT — PAIN SCALES - GENERAL: PAINLEVEL_OUTOF10: 1

## 2024-02-01 NOTE — PROGRESS NOTES
April Ballard  : 2005  Primary: Jac Chung (Javier HUDSON)  Secondary:  [facility] @ [place] [address] [address] [place] [address]  Phone: [phone]  Fax: [phone] Plan Frequency: 2x for 8 weeks (pt work and school schedule may conflict)  Plan of Care/Certification Expiration Date: 24      >PT Visit Info:  Plan Frequency: 2x for 8 weeks (pt work and school schedule may conflict)  Plan of Care/Certification Expiration Date: 24      Visit Count:  7    OUTPATIENT PHYSICAL THERAPY:OP NOTE TYPE: Treatment Note 2024       Episode  }Appt Desk             Treatment Diagnosis:    Chronic pain of left knee  Chronic pain of right knee  Hypermobility syndrome  Pain in left hip  Medical/Referring Diagnosis:  Benign joint hypermobility [M35.7]  Chronic pain of both knees [M25.561, M25.562, G89.29]  Referring Physician:  Topher Sandra DO MD Orders:  PT Eval and Treat    Date of Onset:  Onset Date: 24 (Chronic pain for many years, no DEVIKA)     Allergies:   Minocycline and Adhesive tape  Restrictions/Precautions:  No data recordedNo data recorded   Interventions Planned (Treatment may consist of any combination of the following):    No data recorded   >Subjective Comments:  Floridalma reported she is doing well, she is practicing HEP even with her busy school sx.     >Initial:     1/10>Post Session:       010    Medications Last Reviewed:  2024  Updated Objective Findings:    L hip PROM WNL And non painful   Treatment   THERAPEUTIC EXERCISE: (55 minutes):    Exercises per grid below to improve mobility, strength, and balance.  Required minimal visual and manual cues to promote proper body alignment, promote proper body posture, and promote proper body mechanics.  Progressed resistance, range, and repetitions as indicated.  Date: 24     Date:  24   Activity/Exercise Parameters   bike X4 min    clamshells --   SAQ --   Quadruped hip rocks and cat/camel X15

## 2024-02-06 ENCOUNTER — APPOINTMENT (OUTPATIENT)
Dept: PHYSICAL THERAPY | Age: 19
End: 2024-02-06
Attending: INTERNAL MEDICINE
Payer: COMMERCIAL

## 2024-02-08 ENCOUNTER — HOSPITAL ENCOUNTER (OUTPATIENT)
Dept: PHYSICAL THERAPY | Age: 19
Setting detail: RECURRING SERIES
Discharge: HOME | End: 2024-02-11
Attending: INTERNAL MEDICINE
Payer: COMMERCIAL

## 2024-02-08 PROCEDURE — 97110 THERAPEUTIC EXERCISES: CPT

## 2024-02-08 NOTE — PROGRESS NOTES
April Ballard  : 2005  Primary: Jac Chung (Javier HUDSON)  Secondary:  [facility] @ [place] [address] [address] [place] [place] [address]  Phone: [phone]  Fax: [phone] Plan Frequency: 2x for 8 weeks (pt work and school schedule may conflict)  Plan of Care/Certification Expiration Date: 24      >PT Visit Info:  Plan Frequency: 2x for 8 weeks (pt work and school schedule may conflict)  Plan of Care/Certification Expiration Date: 24      Visit Count:  8    OUTPATIENT PHYSICAL THERAPY:OP NOTE TYPE: Treatment Note 2024       Episode  }Appt Desk             Treatment Diagnosis:    Chronic pain of left knee  Chronic pain of right knee  Hypermobility syndrome  Pain in left hip  Medical/Referring Diagnosis:  Benign joint hypermobility [M35.7]  Chronic pain of both knees [M25.561, M25.562, G89.29]  Referring Physician:  Topher Sandra DO MD Orders:  PT Eval and Treat    Date of Onset:  Onset Date: 24 (Chronic pain for many years, no DEVIKA)     Allergies:   Minocycline and Adhesive tape  Restrictions/Precautions:  No data recordedNo data recorded   Interventions Planned (Treatment may consist of any combination of the following):    No data recorded   >Subjective Comments:  Floridalma states she is doing ok, she had increased back and shoulder pain this weekend after driving to [place] but it resolved. She took her medication shot and it seems to make her pain better.     >Initial:     1/10>Post Session:       010    Medications Last Reviewed:  2024  Updated Objective Findings:    L hip PROM WNL And non painful   Treatment   THERAPEUTIC EXERCISE: (55 minutes):    Exercises per grid below to improve mobility, strength, and balance.  Required minimal visual and manual cues to promote proper body alignment, promote proper body posture, and promote proper body mechanics.  Progressed resistance, range, and repetitions as indicated.  Date: 24     Date:  24

## 2024-02-15 ENCOUNTER — HOSPITAL ENCOUNTER (OUTPATIENT)
Dept: PHYSICAL THERAPY | Age: 19
Setting detail: RECURRING SERIES
Discharge: HOME | End: 2024-02-18
Attending: INTERNAL MEDICINE
Payer: COMMERCIAL

## 2024-02-15 PROCEDURE — 97110 THERAPEUTIC EXERCISES: CPT

## 2024-02-15 ASSESSMENT — PAIN SCALES - GENERAL: PAINLEVEL_OUTOF10: 1

## 2024-02-15 NOTE — PROGRESS NOTES
April Ballard  : 2005  Primary: Jac Chung (Javier HUDSON)  Secondary:  [facility] @ [place] [address] [address] [place] [place] [address]  Phone: [phone]  Fax: [phone] Plan Frequency: 2x for 8 weeks (pt work and school schedule may conflict)  Plan of Care/Certification Expiration Date: 24      >PT Visit Info:  Plan Frequency: 2x for 8 weeks (pt work and school schedule may conflict)  Plan of Care/Certification Expiration Date: 24      Visit Count:  9    OUTPATIENT PHYSICAL THERAPY:OP NOTE TYPE: Treatment Note 2/15/2024       Episode  }Appt Desk             Treatment Diagnosis:    Chronic pain of left knee  Chronic pain of right knee  Hypermobility syndrome  Pain in left hip  Medical/Referring Diagnosis:  Benign joint hypermobility [M35.7]  Chronic pain of both knees [M25.561, M25.562, G89.29]  Referring Physician:  Topher Sandra DO MD Orders:  PT Eval and Treat    Date of Onset:  Onset Date: 24 (Chronic pain for many years, no DEVIKA)     Allergies:   Minocycline and Adhesive tape  Restrictions/Precautions:  No data recordedNo data recorded   Interventions Planned (Treatment may consist of any combination of the following):    No data recorded   >Subjective Comments:  Floridalma reported she is doing ok, she worked a 16 hour shift this weekend and her whole body felt sore after. She was able to do HEP and it made things feel better.     >Initial:     1/10>Post Session:       0/10    Medications Last Reviewed:  2/15/2024  Updated Objective Findings:    L hip PROM WNL And non painful   Treatment   THERAPEUTIC EXERCISE: (55 minutes):    Exercises per grid below to improve mobility, strength, and balance.  Required minimal visual and manual cues to promote proper body alignment, promote proper body posture, and promote proper body mechanics.  Progressed resistance, range, and repetitions as indicated.  Date: 02/15/24     Date:  02/15/24   Activity/Exercise Parameters

## 2024-02-22 ENCOUNTER — APPOINTMENT (OUTPATIENT)
Dept: PHYSICAL THERAPY | Age: 19
End: 2024-02-22
Attending: INTERNAL MEDICINE
Payer: COMMERCIAL

## 2024-03-07 ENCOUNTER — HOSPITAL ENCOUNTER (OUTPATIENT)
Dept: PHYSICAL THERAPY | Age: 19
Setting detail: RECURRING SERIES
Discharge: HOME | End: 2024-03-10
Attending: INTERNAL MEDICINE
Payer: COMMERCIAL

## 2024-03-07 PROCEDURE — 97110 THERAPEUTIC EXERCISES: CPT

## 2024-03-07 NOTE — THERAPY DISCHARGE
April Ballard  : 2005  Primary: Jac Chung (Javier HUDSON)  Secondary:  [facility] @ [place] [address] [place] [address]  Phone: [phone]  Fax: [phone] Plan Frequency: 2x for 8 weeks (pt work and school schedule may conflict)    Plan of Care/Certification Expiration Date: 24        Plan of Care/Certification Expiration Date:  Plan of Care/Certification Expiration Date: 24    Frequency/Duration: Plan Frequency: 2x for 8 weeks (pt work and school schedule may conflict)      Time In/Out:   Time In: 1338  Time Out: 1420      PT Visit Info:    Plan Frequency: 2x for 8 weeks (pt work and school schedule may conflict)      Visit Count:  10                OUTPATIENT PHYSICAL THERAPY:             Discharge Summary 3/7/2024               Episode (B Knee Pain)         Treatment Diagnosis:      Chronic pain of left knee  Chronic pain of right knee  Hypermobility syndrome  Pain in left hip  Medical/Referring Diagnosis:    Benign joint hypermobility  Chronic pain of both knees      Referring Physician:  Topher Sandra DO MD Orders:  PT Eval and Treat   Return MD Appt:     Date of Onset:  Onset Date: 24 (Chronic pain for many years, no DEVIKA)    Allergies:  Minocycline and Adhesive tape  Restrictions/Precautions:      *Hypermobility syndrome*   Medications Last Reviewed:  3/7/2024     SUBJECTIVE   History of Injury/Illness (Reason for Referral):  Discharge 3/7/24:  Floridalma reports a 100% improvement in her B knee pain and back pain over 10 visits of physical therapy. She states her patella seems to be tracking better, she is able to control her knee hyperextension and work as many shifts as needed with minimal pain. She still has flare ups but is able to control them with her exercises. She reports she is ready for d/c and independent management.     Evaluation  April, \"Floridalma\", presents with a chief complaint of joint pain, especially of the L knee located

## 2024-03-07 NOTE — PROGRESS NOTES
April Ballard  : 2005  Primary: Jac Chung (Javier HUDSON)  Secondary:  [facility] @ [place] [address] [address] [place] [place] [address]  Phone: [phone]  Fax: [phone] Plan Frequency: 2x for 8 weeks (pt work and school schedule may conflict)  Plan of Care/Certification Expiration Date: 24      >PT Visit Info:  Plan Frequency: 2x for 8 weeks (pt work and school schedule may conflict)  Plan of Care/Certification Expiration Date: 24      Visit Count:  10    OUTPATIENT PHYSICAL THERAPY:OP NOTE TYPE: Treatment Note 3/7/2024       Episode  }Appt Desk             Treatment Diagnosis:    Chronic pain of left knee  Chronic pain of right knee  Hypermobility syndrome  Pain in left hip  Medical/Referring Diagnosis:  Benign joint hypermobility [M35.7]  Chronic pain of both knees [M25.561, M25.562, G89.29]  Referring Physician:  Topher Sandra DO MD Orders:  PT Eval and Treat    Date of Onset:  Onset Date: 24 (Chronic pain for many years, no DEVIKA)     Allergies:   Minocycline and Adhesive tape  Restrictions/Precautions:  No data recordedNo data recorded   Interventions Planned (Treatment may consist of any combination of the following):    No data recorded   >Subjective Comments:  Floridalma states things are doing very well and she is ready for d/c. See dc note     >Initial:     0/10>Post Session:       0/10    Medications Last Reviewed:  3/7/2024  Updated Objective Findings:    L hip PROM WNL And non painful   Treatment   THERAPEUTIC EXERCISE: (40 minutes):    Exercises per grid below to improve mobility, strength, and balance.  Required minimal visual and manual cues to promote proper body alignment, promote proper body posture, and promote proper body mechanics.  Progressed resistance, range, and repetitions as indicated.      Date:  3/7/24   Activity/Exercise Parameters   bike X4 min    laxmihelsergo Verbal rev    D/c measures and poc discussion Completed    Quadruped hip rocks

## 2024-03-11 ASSESSMENT — PAIN SCALES - GENERAL: PAINLEVEL_OUTOF10: 0

## 2024-03-26 ENCOUNTER — APPOINTMENT (OUTPATIENT)
Dept: PHYSICAL THERAPY | Age: 19
End: 2024-03-26
Attending: INTERNAL MEDICINE
Payer: COMMERCIAL

## 2024-09-25 DIAGNOSIS — K21.9 GASTROESOPHAGEAL REFLUX DISEASE WITHOUT ESOPHAGITIS: ICD-10-CM

## 2024-09-26 RX ORDER — PANTOPRAZOLE SODIUM 40 MG/1
TABLET, DELAYED RELEASE ORAL
Qty: 180 TABLET | Refills: 3 | OUTPATIENT
Start: 2024-09-26

## 2024-10-15 ENCOUNTER — OFFICE VISIT (OUTPATIENT)
Dept: OBGYN CLINIC | Age: 19
End: 2024-10-15
Payer: COMMERCIAL

## 2024-10-15 VITALS
HEIGHT: 61 IN | BODY MASS INDEX: 31.72 KG/M2 | DIASTOLIC BLOOD PRESSURE: 84 MMHG | SYSTOLIC BLOOD PRESSURE: 120 MMHG | WEIGHT: 168 LBS

## 2024-10-15 DIAGNOSIS — Z30.011 ENCOUNTER FOR INITIAL PRESCRIPTION OF CONTRACEPTIVE PILLS: ICD-10-CM

## 2024-10-15 DIAGNOSIS — Z76.89 ESTABLISHING CARE WITH NEW DOCTOR, ENCOUNTER FOR: Primary | ICD-10-CM

## 2024-10-15 PROCEDURE — 99203 OFFICE O/P NEW LOW 30 MIN: CPT | Performed by: NURSE PRACTITIONER

## 2024-10-15 RX ORDER — NORETHINDRONE ACETATE AND ETHINYL ESTRADIOL AND FERROUS FUMARATE 1MG-20(21)
1 KIT ORAL DAILY
Qty: 3 PACKET | Refills: 4 | Status: SHIPPED | OUTPATIENT
Start: 2024-10-15

## 2024-10-15 SDOH — ECONOMIC STABILITY: FOOD INSECURITY: WITHIN THE PAST 12 MONTHS, YOU WORRIED THAT YOUR FOOD WOULD RUN OUT BEFORE YOU GOT MONEY TO BUY MORE.: NEVER TRUE

## 2024-10-15 SDOH — ECONOMIC STABILITY: FOOD INSECURITY: WITHIN THE PAST 12 MONTHS, THE FOOD YOU BOUGHT JUST DIDN'T LAST AND YOU DIDN'T HAVE MONEY TO GET MORE.: NEVER TRUE

## 2024-10-15 SDOH — ECONOMIC STABILITY: INCOME INSECURITY: HOW HARD IS IT FOR YOU TO PAY FOR THE VERY BASICS LIKE FOOD, HOUSING, MEDICAL CARE, AND HEATING?: NOT HARD AT ALL

## 2024-10-15 NOTE — PROGRESS NOTES
CC:   Chief Complaint   Patient presents with    New Patient    Contraception       HPI:    April  is a 19 y.o. NEW patient, , Patient's last menstrual period was 2024., who presents today to discuss contraception refill and to establish care with our office. The patient states has been taking OCPs and needs refill.     She denies fevers, chills, abnormal vaginal discharge, itching, odor, urinary frequency, urgency or dysuria today.   States she has regular bowel movements and denies constipation.     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    Contraception: OCPs/Abstinence.       Age onset menses: 11yo  Menses:  Q 30 Days x 4-6 days, Moderate Flow, Denies intermenstrual VB/spotting, Mild dysmenorrhea (experiences on first few days-takes otc ibuprofen as needed)      States has never been sexually active. No concerns for STDs-declines STD testing today.     PCP: [facility]. Sees as needed. Last seen 2023.       Recent labs collected     2023    CBC: Hgb: 11.6, Plts: 375  Ferritin low-4  Iron-70        GYN HISTORY:  As per HPI     Hx STDs: Denies    Last Pap: N/a due to age      Current Outpatient Medications on File Prior to Visit   Medication Sig Dispense Refill    albuterol sulfate HFA (PROVENTIL;VENTOLIN;PROAIR) 108 (90 Base) MCG/ACT inhaler as needed      Ruxolitinib Phosphate (OPZELURA) 1.5 % CREA Opzelura 1.5 % topical cream      Prenatal Vit-Fe Fumarate-FA (PRENATAL PO) Take by mouth      pantoprazole (PROTONIX) 40 MG tablet TAKE 1 TABLET BY MOUTH TWICE DAILY 180 tablet 3    DUPIXENT 300 MG/2ML SOPN injection       EPINEPHrine (EPIPEN) 0.3 MG/0.3ML SOAJ injection       triamcinolone (KENALOG) 0.1 % cream APPLY TO RASH ON BODY TWICE A DAY AS NEEDED      fexofenadine (ALLEGRA) 180 MG tablet Take 1 tablet by mouth      hydrOXYzine (ATARAX) 25 MG tablet TAKE 1 TAB BY MOUTH AT THE HOUR OF SLEEP       No

## 2024-10-17 ENCOUNTER — TELEPHONE (OUTPATIENT)
Dept: FAMILY MEDICINE CLINIC | Facility: CLINIC | Age: 19
End: 2024-10-17

## 2024-10-17 DIAGNOSIS — R40.20 LOSS OF CONSCIOUSNESS (HCC): Primary | ICD-10-CM

## 2024-10-17 DIAGNOSIS — F44.4: ICD-10-CM
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
## 2024-10-17 NOTE — TELEPHONE ENCOUNTER
Patients' father called back and said they spoke with [facility] about their daughter's situation and the doctor they spoke with (Dr. Siu) informed that a referral would be needed to see him.    A fax number was given:     [phone]    Phone number to office:    [phone]    Both parents request a callback on how they can get the referral sent in.

## 2024-10-17 NOTE — TELEPHONE ENCOUNTER
Mother returned my call and mother stated that since the referral was placed Dr. Siu's office was able to schedule her for Monday 10/21 at 11:30. She said they requested medical records to be faxed over to their office. I faxed over our office notes, lab results, MRI as requested by mother.    F# [phone]

## 2024-10-17 NOTE — TELEPHONE ENCOUNTER
Referral sent for Dr. Siu with [facility].  Please call parents and see if they can bring her in on Monday--unless they get appointment sooner with Dr. Siu.  Margret Thompson

## 2024-10-28 ENCOUNTER — TELEPHONE (OUTPATIENT)
Dept: FAMILY MEDICINE CLINIC | Facility: CLINIC | Age: 19
End: 2024-10-28

## 2024-10-28 DIAGNOSIS — K21.9 GASTROESOPHAGEAL REFLUX DISEASE WITHOUT ESOPHAGITIS: ICD-10-CM

## 2024-10-28 RX ORDER — PANTOPRAZOLE SODIUM 40 MG/1
TABLET, DELAYED RELEASE ORAL
Qty: 180 TABLET | Refills: 3 | Status: SHIPPED | OUTPATIENT
Start: 2024-10-28

## 2025-02-18 ENCOUNTER — OFFICE VISIT (OUTPATIENT)
Dept: OBGYN CLINIC | Age: 20
End: 2025-02-18
Payer: COMMERCIAL

## 2025-02-18 VITALS
BODY MASS INDEX: 31.15 KG/M2 | SYSTOLIC BLOOD PRESSURE: 108 MMHG | HEIGHT: 61 IN | DIASTOLIC BLOOD PRESSURE: 64 MMHG | WEIGHT: 165 LBS

## 2025-02-18 DIAGNOSIS — G40.109 FOCAL EPILEPSY (HCC): ICD-10-CM

## 2025-02-18 DIAGNOSIS — Z30.011 ENCOUNTER FOR BCP (BIRTH CONTROL PILLS) INITIAL PRESCRIPTION: Primary | ICD-10-CM

## 2025-02-18 PROCEDURE — 99213 OFFICE O/P EST LOW 20 MIN: CPT | Performed by: NURSE PRACTITIONER

## 2025-02-18 RX ORDER — ACETAMINOPHEN AND CODEINE PHOSPHATE 120; 12 MG/5ML; MG/5ML
1 SOLUTION ORAL DAILY
Qty: 30 TABLET | Refills: 3 | Status: SHIPPED | OUTPATIENT
Start: 2025-02-18

## 2025-02-18 RX ORDER — LAMOTRIGINE 25 MG/1
TABLET ORAL
COMMUNITY
Start: 2025-01-27
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
## 2025-05-15 ENCOUNTER — OFFICE VISIT (OUTPATIENT)
Dept: OBGYN CLINIC | Age: 20
End: 2025-05-15
Payer: COMMERCIAL

## 2025-05-15 VITALS
SYSTOLIC BLOOD PRESSURE: 108 MMHG | DIASTOLIC BLOOD PRESSURE: 74 MMHG | BODY MASS INDEX: 31.34 KG/M2 | HEIGHT: 61 IN | WEIGHT: 166 LBS

## 2025-05-15 DIAGNOSIS — Z09 FOLLOW-UP EXAM: Primary | ICD-10-CM

## 2025-05-15 PROCEDURE — 99213 OFFICE O/P EST LOW 20 MIN: CPT | Performed by: NURSE PRACTITIONER
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
